# Patient Record
Sex: MALE | URBAN - METROPOLITAN AREA
[De-identification: names, ages, dates, MRNs, and addresses within clinical notes are randomized per-mention and may not be internally consistent; named-entity substitution may affect disease eponyms.]

---

## 2019-06-28 ENCOUNTER — IMPORTED ENCOUNTER (OUTPATIENT)
Dept: URBAN - METROPOLITAN AREA CLINIC 50 | Facility: CLINIC | Age: 79
End: 2019-06-28

## 2019-07-02 ENCOUNTER — IMPORTED ENCOUNTER (OUTPATIENT)
Dept: URBAN - METROPOLITAN AREA CLINIC 50 | Facility: CLINIC | Age: 79
End: 2019-07-02

## 2019-07-03 ENCOUNTER — IMPORTED ENCOUNTER (OUTPATIENT)
Dept: URBAN - METROPOLITAN AREA CLINIC 50 | Facility: CLINIC | Age: 79
End: 2019-07-03

## 2019-09-10 ENCOUNTER — IMPORTED ENCOUNTER (OUTPATIENT)
Dept: URBAN - METROPOLITAN AREA CLINIC 50 | Facility: CLINIC | Age: 79
End: 2019-09-10

## 2019-09-27 ENCOUNTER — IMPORTED ENCOUNTER (OUTPATIENT)
Dept: URBAN - METROPOLITAN AREA CLINIC 50 | Facility: CLINIC | Age: 79
End: 2019-09-27

## 2019-11-06 NOTE — PATIENT DISCUSSION
Patient does not present w/ any visually significant drooping of either upper eyelid at this time. Explained that Medicare will not cover a surgical procedure to excise excess tissue from either upper eyelid at this time. Patient expressed understanding.

## 2020-02-04 NOTE — PATIENT DISCUSSION
Also, please do not hesitate to call us if you have any concerns not addressed by this information. Please call [phone] and we will do everything we can to help you during this period.

## 2020-07-14 NOTE — PATIENT DISCUSSION
NARROW ANGLE GLAUCOMA BY HISTORY (+) FAMILY HISTORY. S/P YAG PI OU. BASELINE DISC PHOTOS AND PACHY DONE TODAY. SCHEDULE VF PRIOR TO NEXT VISIT.  GONIO/RNFL AT FOLLOW UP.

## 2021-01-20 NOTE — PATIENT DISCUSSION
NARROW ANGLE GLAUCOMA BY HISTORY (+) FAMILY HISTORY. S/P YAG PI OU. RNFL AND VF REVIEWED WITH PATIENT. GONIO DONE TODAY. ADD TX IF ANY ON CHANGES. FOLLOW.

## 2021-04-18 ASSESSMENT — TONOMETRY
OS_IOP_MMHG: 14
OD_IOP_MMHG: 14

## 2021-04-18 ASSESSMENT — VISUAL ACUITY
OD_CC: 20/30-2
OD_CC: J3
OS_PH: 20/30-
OS_CC: J3
OS_CC: 20/40

## 2021-07-21 NOTE — PATIENT DISCUSSION
NARROW ANGLE GLAUCOMA BY HISTORY (+) FAMILY HISTORY. S/P YAG PI OU. RNFL AND VF REVIEWED WITH PATIENT. ADD TX IF ANY ON CHANGES. FOLLOW.

## 2021-07-21 NOTE — PATIENT DISCUSSION
16-Oct-2019 11:27 [phone]&nbsp; &nbsp; &nbsp; bmb

## 2022-03-24 NOTE — PATIENT DISCUSSION
[phone]&nbsp; &nbsp; &nbsp; bmb.

## 2022-04-05 NOTE — PROCEDURE NOTE: CLINICAL
PROCEDURE NOTE: Epilation #2 Left Lower Lid. Anesthesia: Topical. Prior to treatment, the risks/benefits/alternatives were discussed. The patient wished to proceed with procedure. Aberrant lashes removed from * lid(s) using microforcep. Patient tolerated procedure well. There were no complications. Post-op instructions given. Barbara Valiente

## 2022-07-07 ENCOUNTER — NEW PATIENT (OUTPATIENT)
Dept: URBAN - METROPOLITAN AREA CLINIC 52 | Facility: CLINIC | Age: 82
End: 2022-07-07

## 2022-07-07 DIAGNOSIS — H25.13: ICD-10-CM

## 2022-07-07 PROCEDURE — 92004 COMPRE OPH EXAM NEW PT 1/>: CPT

## 2022-07-07 PROCEDURE — 92015 DETERMINE REFRACTIVE STATE: CPT

## 2022-07-07 ASSESSMENT — VISUAL ACUITY
OS_GLARE: >20/400
OU_CC: J2@14IN
OS_GLARE: 20/60-1
OS_PH: 20/40
OU_SC: 20/60-1
OD_GLARE: 20/60
OD_GLARE: 20/200
OD_SC: 20/60-1
OD_CC: 20/50+1
OD_PH: 20/40-1
OU_CC: 20/40-1
OS_CC: 20/70
OS_SC: 20/60-1

## 2022-07-07 ASSESSMENT — KERATOMETRY
OD_K2POWER_DIOPTERS: 44.25
OD_AXISANGLE2_DEGREES: 172
OS_AXISANGLE2_DEGREES: 16
OD_AXISANGLE_DEGREES: 82
OS_AXISANGLE_DEGREES: 106
OS_K1POWER_DIOPTERS: 43.00
OD_K1POWER_DIOPTERS: 43.00
OS_K2POWER_DIOPTERS: 43.75

## 2022-07-07 ASSESSMENT — TONOMETRY
OS_IOP_MMHG: 17
OD_IOP_MMHG: 16

## 2022-08-11 ENCOUNTER — PRE-OP/H&P (OUTPATIENT)
Dept: URBAN - METROPOLITAN AREA CLINIC 52 | Facility: CLINIC | Age: 82
End: 2022-08-11

## 2022-08-11 DIAGNOSIS — H25.13: ICD-10-CM

## 2022-08-11 PROCEDURE — PREOP PRE OP VISIT

## 2022-08-11 PROCEDURE — 92134 CPTRZ OPH DX IMG PST SGM RTA: CPT

## 2022-08-11 PROCEDURE — 92025IOL CORNEAL TOPOGRAPHY PREMIUM IOL

## 2022-08-11 PROCEDURE — 92136 OPHTHALMIC BIOMETRY: CPT

## 2022-08-11 ASSESSMENT — KERATOMETRY
OS_AXISANGLE_DEGREES: 40
OS_AXISANGLE2_DEGREES: 130
OD_K2POWER_DIOPTERS: 43.12
OD_AXISANGLE2_DEGREES: 107
OS_K2POWER_DIOPTERS: 43.00
OD_K1POWER_DIOPTERS: 44.25
OD_AXISANGLE_DEGREES: 017
OS_K1POWER_DIOPTERS: 43.50

## 2022-08-11 ASSESSMENT — VISUAL ACUITY
OS_CC: 20/40-2
OD_GLARE: 20/100
OS_GLARE: 20/70
OD_CC: 20/40-1
OS_GLARE: 20/200
OD_GLARE: 20/60

## 2022-08-11 NOTE — PATIENT DISCUSSION
Patient has prism in glasses. Educated patient he will still need prism to correct double vision following surgery.

## 2022-08-12 ENCOUNTER — DIAGNOSTICS ONLY (OUTPATIENT)
Dept: URBAN - METROPOLITAN AREA CLINIC 50 | Facility: CLINIC | Age: 82
End: 2022-08-12

## 2022-08-12 DIAGNOSIS — H25.13: ICD-10-CM

## 2022-08-12 PROCEDURE — 92025IOL CORNEAL TOPOGRAPHY PREMIUM IOL

## 2022-08-12 PROCEDURE — 76516 ECHO EXAM OF EYE: CPT

## 2022-08-12 ASSESSMENT — KERATOMETRY
OS_K1POWER_DIOPTERS: 43.50
OD_AXISANGLE2_DEGREES: 75
OD_AXISANGLE_DEGREES: 017
OS_AXISANGLE_DEGREES: 019
OD_K1POWER_DIOPTERS: 44.50
OS_K1POWER_DIOPTERS: 43.37
OD_K2POWER_DIOPTERS: 43.12
OS_AXISANGLE2_DEGREES: 130
OD_K1POWER_DIOPTERS: 44.25
OS_K2POWER_DIOPTERS: 42.25
OD_AXISANGLE2_DEGREES: 107
OD_AXISANGLE_DEGREES: 165
OS_K2POWER_DIOPTERS: 43.00
OS_AXISANGLE_DEGREES: 40
OS_AXISANGLE2_DEGREES: 109

## 2022-08-17 ENCOUNTER — POST-OP (OUTPATIENT)
Dept: URBAN - METROPOLITAN AREA CLINIC 52 | Facility: CLINIC | Age: 82
End: 2022-08-17

## 2022-08-17 ENCOUNTER — SURGERY/PROCEDURE (OUTPATIENT)
Dept: URBAN - METROPOLITAN AREA SURGERY 16 | Facility: SURGERY | Age: 82
End: 2022-08-17

## 2022-08-17 DIAGNOSIS — Z98.41: ICD-10-CM

## 2022-08-17 DIAGNOSIS — H25.11: ICD-10-CM

## 2022-08-17 DIAGNOSIS — Z96.1: ICD-10-CM

## 2022-08-17 PROCEDURE — 99199PCLA CLASSIC VISION PACKAGE

## 2022-08-17 PROCEDURE — 66984CV REMOVE CATARACT, INSERT LENS, CUSTOM VISION

## 2022-08-17 ASSESSMENT — KERATOMETRY
OD_K2POWER_DIOPTERS: 43.12
OS_K1POWER_DIOPTERS: 43.37
OS_K1POWER_DIOPTERS: 43.50
OD_AXISANGLE_DEGREES: 017
OD_K1POWER_DIOPTERS: 44.25
OD_K1POWER_DIOPTERS: 44.50
OD_AXISANGLE_DEGREES: 165
OS_AXISANGLE_DEGREES: 40
OS_AXISANGLE2_DEGREES: 130
OS_K2POWER_DIOPTERS: 43.00
OS_K2POWER_DIOPTERS: 42.25
OD_AXISANGLE2_DEGREES: 107
OS_AXISANGLE_DEGREES: 019
OS_AXISANGLE2_DEGREES: 109
OD_AXISANGLE2_DEGREES: 75

## 2022-08-17 ASSESSMENT — TONOMETRY: OD_IOP_MMHG: 24

## 2022-08-17 ASSESSMENT — VISUAL ACUITY: OD_SC: 20/70

## 2022-08-23 ENCOUNTER — POST OP/EVAL OF SECOND EYE (OUTPATIENT)
Dept: URBAN - METROPOLITAN AREA CLINIC 50 | Facility: CLINIC | Age: 82
End: 2022-08-23

## 2022-08-23 DIAGNOSIS — H25.12: ICD-10-CM

## 2022-08-23 PROCEDURE — 92136 - 2N OPHTHALMIC BIOMETRY BY PARTIAL COHERENCE INTERFEROMETRY WITH INTRAOCULAR LENS POWER CALCULATION

## 2022-08-23 PROCEDURE — 99213 OFFICE O/P EST LOW 20 MIN: CPT

## 2022-08-23 ASSESSMENT — VISUAL ACUITY
OD_SC: 20/30+1
OD_PH: 20/30+2
OD_SC: J2
OS_CC: 20/80
OD_SC: 20/20-1

## 2022-08-23 ASSESSMENT — TONOMETRY
OS_IOP_MMHG: 16
OD_IOP_MMHG: 16

## 2022-08-31 ENCOUNTER — POST-OP (OUTPATIENT)
Dept: URBAN - METROPOLITAN AREA CLINIC 52 | Facility: CLINIC | Age: 82
End: 2022-08-31

## 2022-08-31 ENCOUNTER — SURGERY/PROCEDURE (OUTPATIENT)
Dept: URBAN - METROPOLITAN AREA SURGERY 16 | Facility: SURGERY | Age: 82
End: 2022-08-31

## 2022-08-31 DIAGNOSIS — Z98.42: ICD-10-CM

## 2022-08-31 DIAGNOSIS — H25.12: ICD-10-CM

## 2022-08-31 DIAGNOSIS — Z96.1: ICD-10-CM

## 2022-08-31 PROCEDURE — 99199PCLA CLASSIC VISION PACKAGE

## 2022-08-31 PROCEDURE — 66984CV REMOVE CATARACT, INSERT LENS, CUSTOM VISION

## 2022-08-31 ASSESSMENT — VISUAL ACUITY
OS_PH: 20/40-1
OS_SC: 20/100-2

## 2022-08-31 ASSESSMENT — TONOMETRY: OS_IOP_MMHG: 27

## 2022-09-08 ENCOUNTER — POST-OP (OUTPATIENT)
Dept: URBAN - METROPOLITAN AREA CLINIC 52 | Facility: CLINIC | Age: 82
End: 2022-09-08

## 2022-09-08 DIAGNOSIS — Z98.42: ICD-10-CM

## 2022-09-08 DIAGNOSIS — Z96.1: ICD-10-CM

## 2022-09-08 PROCEDURE — 99024 POSTOP FOLLOW-UP VISIT: CPT

## 2022-09-08 ASSESSMENT — VISUAL ACUITY
OD_SC: 20/30
OS_SC: J1+
OS_PH: 20/40
OD_SC: J5
OS_SC: 20/60
OU_SC: J1+
OU_SC: 20/30

## 2022-09-08 ASSESSMENT — TONOMETRY
OS_IOP_MMHG: 16
OD_IOP_MMHG: 16

## 2022-09-30 ENCOUNTER — POST-OP (OUTPATIENT)
Dept: URBAN - METROPOLITAN AREA CLINIC 49 | Facility: CLINIC | Age: 82
End: 2022-09-30

## 2022-09-30 DIAGNOSIS — Z98.42: ICD-10-CM

## 2022-09-30 PROCEDURE — 99024 POSTOP FOLLOW-UP VISIT: CPT

## 2022-09-30 ASSESSMENT — VISUAL ACUITY
OU_SC: 20/50
OS_SC: J1+
OU_SC: J1+
OS_PH: 20/25
OS_SC: 20/70+1
OD_PH: 20/25
OD_SC: 20/30+2

## 2022-09-30 ASSESSMENT — TONOMETRY
OS_IOP_MMHG: 16
OD_IOP_MMHG: 16

## 2022-09-30 NOTE — PATIENT DISCUSSION
educated patient flashing outer corner is likely the lens implant catching the light. reassured patient that he does not have a RD.

## 2023-10-10 ENCOUNTER — COMPREHENSIVE EXAM (OUTPATIENT)
Dept: URBAN - METROPOLITAN AREA CLINIC 52 | Facility: CLINIC | Age: 83
End: 2023-10-10

## 2023-10-10 DIAGNOSIS — H43.811: ICD-10-CM

## 2023-10-10 DIAGNOSIS — H53.2: ICD-10-CM

## 2023-10-10 DIAGNOSIS — H02.836: ICD-10-CM

## 2023-10-10 DIAGNOSIS — H49.11: ICD-10-CM

## 2023-10-10 DIAGNOSIS — H02.833: ICD-10-CM

## 2023-10-10 PROCEDURE — 92134 CPTRZ OPH DX IMG PST SGM RTA: CPT | Mod: NC

## 2023-10-10 PROCEDURE — 92014 COMPRE OPH EXAM EST PT 1/>: CPT

## 2023-10-10 PROCEDURE — 92015 DETERMINE REFRACTIVE STATE: CPT

## 2023-10-10 ASSESSMENT — KERATOMETRY
OS_K2POWER_DIOPTERS: 43.50
OS_K1POWER_DIOPTERS: 43.50
OD_AXISANGLE2_DEGREES: 175
OD_K1POWER_DIOPTERS: 42.50
OS_AXISANGLE2_DEGREES: 90
OS_AXISANGLE_DEGREES: 0
OD_AXISANGLE_DEGREES: 085
OD_K2POWER_DIOPTERS: 43.75

## 2023-10-10 ASSESSMENT — VISUAL ACUITY
OS_GLARE: 20/20-1
OD_PH: 20/25-2
OU_CC: 20/20
OU_SC: 20/20"16IN
OS_GLARE: 20/20
OS_CC: 20/20
OD_CC: 20/30
OD_GLARE: 20/30-2
OD_GLARE: 20/30-1

## 2023-10-10 ASSESSMENT — TONOMETRY
OD_IOP_MMHG: 16
OS_IOP_MMHG: 18

## 2023-11-29 ENCOUNTER — CONTACT LENSES/GLASSES VISIT (OUTPATIENT)
Dept: URBAN - METROPOLITAN AREA CLINIC 52 | Facility: CLINIC | Age: 83
End: 2023-11-29

## 2023-11-29 DIAGNOSIS — H53.2: ICD-10-CM

## 2023-11-29 PROCEDURE — 92015GRNC REFRACTION GLASSES RECHECK NO CHARGE

## 2023-11-29 ASSESSMENT — VISUAL ACUITY
OD_CC: 20/20-1
OU_SC: J1+
OS_CC: 20/20
OU_CC: 20/20-1

## 2023-11-29 ASSESSMENT — KERATOMETRY
OD_K2POWER_DIOPTERS: 43.50
OD_AXISANGLE2_DEGREES: 176
OS_K2POWER_DIOPTERS: 43.25
OD_K1POWER_DIOPTERS: 42.25
OD_AXISANGLE_DEGREES: 86
OS_AXISANGLE_DEGREES: 88
OS_AXISANGLE2_DEGREES: 178
OS_K1POWER_DIOPTERS: 42.50